# Patient Record
Sex: FEMALE | Race: WHITE
[De-identification: names, ages, dates, MRNs, and addresses within clinical notes are randomized per-mention and may not be internally consistent; named-entity substitution may affect disease eponyms.]

---

## 2017-03-29 ENCOUNTER — HOSPITAL ENCOUNTER (INPATIENT)
Dept: HOSPITAL 4 - SDS | Age: 32
LOS: 1 days | Discharge: HOME | DRG: 777 | End: 2017-03-30
Attending: SPECIALIST | Admitting: SPECIALIST
Payer: COMMERCIAL

## 2017-03-29 VITALS — BODY MASS INDEX: 35.61 KG/M2 | WEIGHT: 201 LBS | HEIGHT: 63 IN

## 2017-03-29 VITALS — SYSTOLIC BLOOD PRESSURE: 121 MMHG

## 2017-03-29 VITALS — SYSTOLIC BLOOD PRESSURE: 104 MMHG

## 2017-03-29 DIAGNOSIS — O43.211: ICD-10-CM

## 2017-03-29 DIAGNOSIS — O00.80: Primary | ICD-10-CM

## 2017-03-29 DIAGNOSIS — O99.211: ICD-10-CM

## 2017-03-29 DIAGNOSIS — E66.01: ICD-10-CM

## 2017-03-29 DIAGNOSIS — Z3A.01: ICD-10-CM

## 2017-03-29 DIAGNOSIS — O34.219: ICD-10-CM

## 2017-03-29 LAB
ALBUMIN SERPL BCP-MCNC: 3.7 G/DL (ref 3.4–4.8)
ALT SERPL W P-5'-P-CCNC: 33 U/L (ref 12–78)
ANION GAP SERPL CALCULATED.3IONS-SCNC: 9 MMOL/L (ref 5–15)
AST SERPL W P-5'-P-CCNC: 22 U/L (ref 10–37)
BASOPHILS # BLD AUTO: 0.1 K/UL (ref 0–0.2)
BASOPHILS NFR BLD AUTO: 0.6 % (ref 0–2)
BILIRUB SERPL-MCNC: 0.5 MG/DL (ref 0–1)
BUN SERPL-MCNC: 7 MG/DL (ref 8–21)
CALCIUM SERPL-MCNC: 8.8 MG/DL (ref 8.4–11)
CHLORIDE SERPL-SCNC: 102 MMOL/L (ref 98–107)
CLOSURE TME COLL+ADP BLD: 71 SECONDS (ref 64–106)
CLOSURE TME COLL+ADP BLD: 82 SECONDS (ref 80–184)
CREAT SERPL-MCNC: 0.72 MG/DL (ref 0.55–1.3)
EOSINOPHIL # BLD AUTO: 0.1 K/UL (ref 0–0.4)
EOSINOPHIL NFR BLD AUTO: 0.7 % (ref 0–4)
ERYTHROCYTE [DISTWIDTH] IN BLOOD BY AUTOMATED COUNT: 12.4 % (ref 9–15)
GFR SERPL CREATININE-BSD FRML MDRD: 120 ML/MIN (ref 90–?)
GLUCOSE SERPL-MCNC: 82 MG/DL (ref 70–99)
HCT VFR BLD AUTO: 41.4 % (ref 36–48)
HGB BLD-MCNC: 14.2 G/DL (ref 12–16)
INR PPP: 1 (ref 0.8–1.2)
LYMPHOCYTES # BLD AUTO: 2.2 K/UL (ref 1–5.5)
LYMPHOCYTES NFR BLD AUTO: 24.4 % (ref 20.5–51.5)
MCH RBC QN AUTO: 29 PG (ref 27–31)
MCHC RBC AUTO-ENTMCNC: 34 % (ref 32–36)
MCV RBC AUTO: 83 FL (ref 79–98)
MONOCYTES # BLD MANUAL: 0.2 K/UL (ref 0–1)
MONOCYTES # BLD MANUAL: 2 % (ref 1.7–9.3)
NEUTROPHILS # BLD AUTO: 6.6 K/UL (ref 1.8–7.7)
NEUTROPHILS NFR BLD AUTO: 72.3 % (ref 40–70)
PLATELET # BLD AUTO: 265 K/UL (ref 130–430)
POTASSIUM SERPL-SCNC: 3.4 MMOL/L (ref 3.5–5.1)
PROT SERPL-MCNC: 7.9 G/DL (ref 6.4–8.3)
PROTHROMBIN TIME: 11 SECS (ref 9.5–12.5)
RBC # BLD AUTO: 4.99 MIL/UL (ref 4.2–6.2)
SODIUM SERPLBLD-SCNC: 136 MMOL/L (ref 136–145)
WBC # BLD AUTO: 9.2 K/UL (ref 4.8–10.8)

## 2017-03-29 PROCEDURE — 10D28ZZ EXTRACTION OF PRODUCTS OF CONCEPTION, ECTOPIC, VIA NATURAL OR ARTIFICIAL OPENING ENDOSCOPIC: ICD-10-PCS | Performed by: SPECIALIST

## 2017-03-29 RX ADMIN — METHYLERGONOVINE MALEATE SCH MG: 0.2 TABLET ORAL at 23:48

## 2017-03-30 RX ADMIN — METHYLERGONOVINE MALEATE SCH MG: 0.2 TABLET ORAL at 14:50

## 2017-03-30 RX ADMIN — METHYLERGONOVINE MALEATE SCH MG: 0.2 TABLET ORAL at 06:26

## 2018-09-06 ENCOUNTER — HOSPITAL ENCOUNTER (OUTPATIENT)
Dept: HOSPITAL 4 - SPU | Age: 33
Setting detail: OBSERVATION
LOS: 1 days | Discharge: HOME | End: 2018-09-07
Attending: SPECIALIST | Admitting: SPECIALIST
Payer: COMMERCIAL

## 2018-09-06 VITALS — BODY MASS INDEX: 42.52 KG/M2 | HEIGHT: 63 IN | WEIGHT: 240 LBS

## 2018-09-06 DIAGNOSIS — Z3A.36: ICD-10-CM

## 2018-09-06 LAB
APPEARANCE UR: CLEAR
BACTERIA URNS QL MICRO: (no result) /HPF
BASOPHILS # BLD AUTO: 0 K/UL (ref 0–0.2)
BASOPHILS NFR BLD AUTO: 0.2 % (ref 0–2)
BILIRUB UR QL STRIP: NEGATIVE
COLOR UR: YELLOW
EOSINOPHIL # BLD AUTO: 0 K/UL (ref 0–0.4)
EOSINOPHIL NFR BLD AUTO: 0.4 % (ref 0–4)
ERYTHROCYTE [DISTWIDTH] IN BLOOD BY AUTOMATED COUNT: 13.4 % (ref 9–15)
GLUCOSE UR STRIP-MCNC: NEGATIVE MG/DL
HCT VFR BLD AUTO: 24.3 % (ref 36–48)
HGB BLD-MCNC: 8.8 G/DL (ref 12–16)
HGB UR QL STRIP: (no result)
KETONES UR STRIP-MCNC: (no result) MG/DL
LEUKOCYTE ESTERASE UR QL STRIP: (no result)
LYMPHOCYTES # BLD AUTO: 0.9 K/UL (ref 1–5.5)
LYMPHOCYTES NFR BLD AUTO: 13.1 % (ref 20.5–51.5)
MCH RBC QN AUTO: 30 PG (ref 27–31)
MCHC RBC AUTO-ENTMCNC: 36 % (ref 32–36)
MCV RBC AUTO: 85 FL (ref 79–98)
MONOCYTES # BLD MANUAL: 0.4 K/UL (ref 0–1)
MONOCYTES # BLD MANUAL: 5.2 % (ref 1.7–9.3)
NEUTROPHILS # BLD AUTO: 5.8 K/UL (ref 1.8–7.7)
NEUTROPHILS NFR BLD AUTO: 81.1 % (ref 40–70)
NITRITE UR QL STRIP: NEGATIVE
PH UR STRIP: 6.5 [PH] (ref 5–8)
PLATELET # BLD AUTO: 137 K/UL (ref 130–430)
PROT UR QL STRIP: (no result)
RBC # BLD AUTO: 2.88 MIL/UL (ref 4.2–6.2)
RBC #/AREA URNS HPF: (no result) /HPF (ref 0–3)
SP GR UR STRIP: 1.01 (ref 1–1.03)
UROBILINOGEN UR STRIP-MCNC: 0.2 MG/DL (ref 0.2–1)
WBC # BLD AUTO: 7.1 K/UL (ref 4.8–10.8)
WBC #/AREA URNS HPF: (no result) /HPF (ref 0–3)

## 2018-09-06 PROCEDURE — 36415 COLL VENOUS BLD VENIPUNCTURE: CPT

## 2018-09-06 PROCEDURE — 86901 BLOOD TYPING SEROLOGIC RH(D): CPT

## 2018-09-06 PROCEDURE — 86900 BLOOD TYPING SEROLOGIC ABO: CPT

## 2018-09-06 PROCEDURE — 86592 SYPHILIS TEST NON-TREP QUAL: CPT

## 2018-09-06 PROCEDURE — 81000 URINALYSIS NONAUTO W/SCOPE: CPT

## 2018-09-06 PROCEDURE — 85025 COMPLETE CBC W/AUTO DIFF WBC: CPT

## 2018-09-06 PROCEDURE — G0378 HOSPITAL OBSERVATION PER HR: HCPCS

## 2018-09-06 PROCEDURE — 81002 URINALYSIS NONAUTO W/O SCOPE: CPT

## 2018-09-06 PROCEDURE — 86886 COOMBS TEST INDIRECT TITER: CPT

## 2018-09-08 ENCOUNTER — HOSPITAL ENCOUNTER (INPATIENT)
Dept: HOSPITAL 4 - SPU | Age: 33
LOS: 6 days | Discharge: HOME | DRG: 778 | End: 2018-09-14
Attending: SPECIALIST | Admitting: SPECIALIST
Payer: COMMERCIAL

## 2018-09-08 VITALS — HEIGHT: 63 IN | BODY MASS INDEX: 42.52 KG/M2 | WEIGHT: 240 LBS

## 2018-09-08 DIAGNOSIS — O60.03: Primary | ICD-10-CM

## 2018-09-08 DIAGNOSIS — Z3A.36: ICD-10-CM

## 2018-09-08 PROCEDURE — G0378 HOSPITAL OBSERVATION PER HR: HCPCS

## 2018-09-08 RX ADMIN — PROMETHAZINE HYDROCHLORIDE PRN MG: 25 INJECTION INTRAMUSCULAR; INTRAVENOUS at 22:10

## 2018-09-08 RX ADMIN — MEPERIDINE HYDROCHLORIDE PRN MG: 100 INJECTION INTRAMUSCULAR; INTRAVENOUS; SUBCUTANEOUS at 22:11

## 2018-09-09 RX ADMIN — MEPERIDINE HYDROCHLORIDE PRN MG: 100 INJECTION INTRAMUSCULAR; INTRAVENOUS; SUBCUTANEOUS at 06:23

## 2018-09-09 RX ADMIN — MEPERIDINE HYDROCHLORIDE PRN MG: 100 INJECTION INTRAMUSCULAR; INTRAVENOUS; SUBCUTANEOUS at 02:21

## 2018-09-09 RX ADMIN — PROMETHAZINE HYDROCHLORIDE PRN MG: 25 INJECTION INTRAMUSCULAR; INTRAVENOUS at 02:20

## 2018-09-09 RX ADMIN — PROMETHAZINE HYDROCHLORIDE PRN MG: 25 INJECTION INTRAMUSCULAR; INTRAVENOUS at 06:24

## 2018-09-20 ENCOUNTER — HOSPITAL ENCOUNTER (INPATIENT)
Dept: HOSPITAL 4 - SPU | Age: 33
LOS: 2 days | Discharge: HOME | End: 2018-09-22
Attending: SPECIALIST | Admitting: SPECIALIST
Payer: COMMERCIAL

## 2018-09-20 VITALS — WEIGHT: 240 LBS | BODY MASS INDEX: 42.52 KG/M2 | HEIGHT: 63 IN

## 2018-09-20 VITALS — SYSTOLIC BLOOD PRESSURE: 120 MMHG

## 2018-09-20 VITALS — SYSTOLIC BLOOD PRESSURE: 132 MMHG

## 2018-09-20 DIAGNOSIS — E66.01: ICD-10-CM

## 2018-09-20 DIAGNOSIS — Z3A.38: ICD-10-CM

## 2018-09-20 DIAGNOSIS — O34.211: Primary | ICD-10-CM

## 2018-09-20 DIAGNOSIS — Z88.5: ICD-10-CM

## 2018-09-20 LAB
APPEARANCE UR: (no result)
BACTERIA URNS QL MICRO: (no result) /HPF
BASOPHILS # BLD AUTO: 0.1 K/UL (ref 0–0.2)
BASOPHILS NFR BLD AUTO: 0.7 % (ref 0–2)
BILIRUB UR QL STRIP: NEGATIVE
COLOR UR: YELLOW
EOSINOPHIL # BLD AUTO: 0.1 K/UL (ref 0–0.4)
EOSINOPHIL NFR BLD AUTO: 0.9 % (ref 0–4)
ERYTHROCYTE [DISTWIDTH] IN BLOOD BY AUTOMATED COUNT: 13.6 % (ref 9–15)
GLUCOSE UR STRIP-MCNC: NEGATIVE MG/DL
HCT VFR BLD AUTO: 36 % (ref 36–48)
HGB BLD-MCNC: 12.3 G/DL (ref 12–16)
HGB UR QL STRIP: NEGATIVE
KETONES UR STRIP-MCNC: (no result) MG/DL
LEUKOCYTE ESTERASE UR QL STRIP: (no result)
LYMPHOCYTES # BLD AUTO: 2.1 K/UL (ref 1–5.5)
LYMPHOCYTES NFR BLD AUTO: 22.8 % (ref 20.5–51.5)
MCH RBC QN AUTO: 30 PG (ref 27–31)
MCHC RBC AUTO-ENTMCNC: 34 % (ref 32–36)
MCV RBC AUTO: 86 FL (ref 79–98)
MONOCYTES # BLD MANUAL: 0.4 K/UL (ref 0–1)
MONOCYTES # BLD MANUAL: 4.6 % (ref 1.7–9.3)
NEUTROPHILS # BLD AUTO: 6.3 K/UL (ref 1.8–7.7)
NEUTROPHILS NFR BLD AUTO: 71 % (ref 40–70)
NITRITE UR QL STRIP: NEGATIVE
PH UR STRIP: 6 [PH] (ref 5–8)
PLATELET # BLD AUTO: 178 K/UL (ref 130–430)
PROT UR QL STRIP: NEGATIVE
RBC # BLD AUTO: 4.17 MIL/UL (ref 4.2–6.2)
RBC #/AREA URNS HPF: (no result) /HPF (ref 0–3)
SP GR UR STRIP: 1.02 (ref 1–1.03)
UROBILINOGEN UR STRIP-MCNC: 0.2 MG/DL (ref 0.2–1)
WBC # BLD AUTO: 9 K/UL (ref 4.8–10.8)
WBC #/AREA URNS HPF: (no result) /HPF (ref 0–3)

## 2018-09-20 RX ADMIN — CEFAZOLIN SODIUM SCH MLS/HR: 1 INJECTION, SOLUTION INTRAVENOUS at 20:13

## 2018-09-20 RX ADMIN — KETOROLAC TROMETHAMINE SCH MG: 30 INJECTION, SOLUTION INTRAMUSCULAR; INTRAVENOUS at 13:00

## 2018-09-20 RX ADMIN — MEPERIDINE HYDROCHLORIDE PRN MG: 100 INJECTION INTRAMUSCULAR; INTRAVENOUS; SUBCUTANEOUS at 11:16

## 2018-09-20 RX ADMIN — PROMETHAZINE HYDROCHLORIDE PRN MG: 25 INJECTION INTRAMUSCULAR; INTRAVENOUS at 20:10

## 2018-09-20 RX ADMIN — PROMETHAZINE HYDROCHLORIDE PRN MG: 25 INJECTION INTRAMUSCULAR; INTRAVENOUS at 11:14

## 2018-09-20 RX ADMIN — KETOROLAC TROMETHAMINE SCH MG: 30 INJECTION, SOLUTION INTRAMUSCULAR; INTRAVENOUS at 18:25

## 2018-09-20 RX ADMIN — MEPERIDINE HYDROCHLORIDE PRN MG: 100 INJECTION INTRAMUSCULAR; INTRAVENOUS; SUBCUTANEOUS at 14:47

## 2018-09-20 RX ADMIN — CEFAZOLIN SODIUM SCH MLS/HR: 1 INJECTION, SOLUTION INTRAVENOUS at 14:30

## 2018-09-20 RX ADMIN — PROMETHAZINE HYDROCHLORIDE PRN MG: 25 INJECTION INTRAMUSCULAR; INTRAVENOUS at 14:47

## 2018-09-20 RX ADMIN — MEPERIDINE HYDROCHLORIDE PRN MG: 100 INJECTION INTRAMUSCULAR; INTRAVENOUS; SUBCUTANEOUS at 20:22

## 2018-09-21 PROCEDURE — 30233S1 TRANSFUSION OF NONAUTOLOGOUS GLOBULIN INTO PERIPHERAL VEIN, PERCUTANEOUS APPROACH: ICD-10-PCS | Performed by: SPECIALIST

## 2018-09-21 RX ADMIN — MEPERIDINE HYDROCHLORIDE PRN MG: 100 INJECTION INTRAMUSCULAR; INTRAVENOUS; SUBCUTANEOUS at 02:42

## 2018-09-21 RX ADMIN — MEPERIDINE HYDROCHLORIDE PRN MG: 100 INJECTION INTRAMUSCULAR; INTRAVENOUS; SUBCUTANEOUS at 20:15

## 2018-09-21 RX ADMIN — PROMETHAZINE HYDROCHLORIDE PRN MG: 25 INJECTION INTRAMUSCULAR; INTRAVENOUS at 02:40

## 2018-09-21 RX ADMIN — PROMETHAZINE HYDROCHLORIDE PRN MG: 25 INJECTION INTRAMUSCULAR; INTRAVENOUS at 20:15

## 2018-09-21 RX ADMIN — MEPERIDINE HYDROCHLORIDE PRN MG: 100 INJECTION INTRAMUSCULAR; INTRAVENOUS; SUBCUTANEOUS at 06:59

## 2018-09-21 RX ADMIN — PROMETHAZINE HYDROCHLORIDE PRN MG: 25 INJECTION INTRAMUSCULAR; INTRAVENOUS at 06:56

## 2018-09-21 RX ADMIN — MEPERIDINE HYDROCHLORIDE PRN MG: 100 INJECTION INTRAMUSCULAR; INTRAVENOUS; SUBCUTANEOUS at 11:36

## 2018-09-21 RX ADMIN — PROMETHAZINE HYDROCHLORIDE PRN MG: 25 INJECTION INTRAMUSCULAR; INTRAVENOUS at 11:36

## 2018-09-21 RX ADMIN — KETOROLAC TROMETHAMINE SCH MG: 30 INJECTION, SOLUTION INTRAMUSCULAR; INTRAVENOUS at 12:00

## 2018-09-21 RX ADMIN — IBUPROFEN SCH MG: 600 TABLET, FILM COATED ORAL at 23:30

## 2018-09-22 RX ADMIN — IBUPROFEN SCH MG: 600 TABLET, FILM COATED ORAL at 12:27

## 2018-09-22 RX ADMIN — OXYCODONE HYDROCHLORIDE AND ACETAMINOPHEN PRN TAB: 5; 325 TABLET ORAL at 16:23

## 2018-09-22 RX ADMIN — IBUPROFEN SCH MG: 600 TABLET, FILM COATED ORAL at 05:35

## 2018-09-22 RX ADMIN — OXYCODONE HYDROCHLORIDE AND ACETAMINOPHEN PRN TAB: 5; 325 TABLET ORAL at 12:28

## 2018-09-22 RX ADMIN — IBUPROFEN SCH MG: 600 TABLET, FILM COATED ORAL at 17:57

## 2018-09-22 RX ADMIN — OXYCODONE HYDROCHLORIDE AND ACETAMINOPHEN PRN TAB: 5; 325 TABLET ORAL at 19:05

## 2018-09-22 NOTE — NUR
Dietitian Recommendations 



* Recommend continuing regular diet per MD

LP, RD



Please refer to Nutrition Assessment for details.

## 2025-02-18 ENCOUNTER — HOSPITAL ENCOUNTER (EMERGENCY)
Dept: HOSPITAL 15 - ER | Age: 40
Discharge: HOME | End: 2025-02-18
Payer: COMMERCIAL

## 2025-02-18 VITALS
RESPIRATION RATE: 17 BRPM | HEART RATE: 96 BPM | TEMPERATURE: 98 F | OXYGEN SATURATION: 100 % | SYSTOLIC BLOOD PRESSURE: 139 MMHG | DIASTOLIC BLOOD PRESSURE: 79 MMHG

## 2025-02-18 VITALS — BODY MASS INDEX: 25.74 KG/M2 | WEIGHT: 145.28 LBS | HEIGHT: 63 IN
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
## 2025-02-18 DIAGNOSIS — Z79.891: ICD-10-CM
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
## 2025-02-18 DIAGNOSIS — Z88.5: ICD-10-CM
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
## 2025-02-18 DIAGNOSIS — S63.501A: Primary | ICD-10-CM

## 2025-02-18 PROCEDURE — 73110 X-RAY EXAM OF WRIST: CPT

## 2025-02-18 PROCEDURE — 99283 EMERGENCY DEPT VISIT LOW MDM: CPT

## 2025-02-18 PROCEDURE — 96372 THER/PROPH/DIAG INJ SC/IM: CPT

## 2025-02-18 RX ADMIN — KETOROLAC TROMETHAMINE ONE MG: 30 INJECTION, SOLUTION INTRAMUSCULAR; INTRAVENOUS at 16:45

## 2025-02-18 NOTE — ED.PDOC
Musculoskeletal


HPI Comments


39 year old F presents for possible fracture to the right wirst.


Reports this is a work injury


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


Landed on right side but c/o right wrist pain


Now experiencing pain w/ flexion and extension


Denies previous fractures.


Chief Complaint:  Fall Injury


Time Seen by MD:  13:46


Primary Care Provider:  none


Reviewed Notes:  Nurses Notes, Medications, Allergies


Allergies:  


Coded Allergies:  


     Codeine (Verified  Allergy, Unknown, 2/18/25)


     Morphine (Verified  Allergy, Unknown, 2/18/25)


Home Meds


Active Scripts


Naproxen (Naproxen) 500 Mg Tab, 500 MG PO BIDPRN PRN for 10 Days, #20 TAB 0 

Refills


   Prov:JUAN FRANCISCO DELUCA NP         2/18/25


Information Source:  Patient


Mode of Arrival:  Ambulatory





Social History


Smoker:  Non-Smoker


Alcohol:  Denies ETOH Use


Drugs:  Denies Drug Use





All Other Systems:  Reviewed and Negative (Per HPI)





Physical Exam


General Appearance:  No Apparent Distress, Normal


HEENT:  Normal ENT Inspection, Pharynx Normal, TMs Normal


Neck:  Full Range of Motion, Non-Tender, Normal, Normal Inspection


Respiratory:  Chest Non-Tender, Lungs Clear, No Accessory Muscle Use, No 

Respiratory Distress, Normal Breath Sounds


Cardiovascular:  No Murmur, No Gallop, Regular Rate/Rhythm


Breast Exam:  Deferred


Gastrointestinal:  No Organomegaly, Non Tender, No Pulsatile Mass, Normal Bowel 

Sounds, Soft


Genitalia:  Deferred


Pelvic:  Deferred


Rectal:  Deferred


Extremities:  No calf tenderness, Normal capillary refill, Normal inspection, 

Normal range of motion, Non-tender, No pedal edema


Musculoskeletal :  


   Location:  Right


   Extremity Location:  Wrist (normal on inspection)


   Apperance:  Normal


Neurologic:  Alert, No Motor Deficits, Normal Affect, Normal Mood, No Sensory 

Deficits


Cerebellar Function:  Normal


Reflexes:  Normal


Skin:  Dry, Normal Color, Warm


Lymphatic:  No Adenopathy





Was a procedure done?


Was a procedure done?:  No





Differential Diagnosis EXT


Differential Diagnosis:  Fracture, Sprain, Dislocation





X-Ray, Labs, Meds, VS





                                   Vital Signs








  Date Time  Temp Pulse Resp B/P (MAP) Pulse Ox O2 Delivery O2 Flow Rate FiO2


 


2/18/25 14:23  96 17  100 Room Air  


 


2/18/25 14:23 98.0 96 17 139/79 (99) 100   





 98.0       


 


2/18/25 13:34 98.0 96 17 139/74 (95) 100   








X-Ray, Labs, Meds, VS Comment


History and examination consistent of muscular injury


X-rays ordered, read by radiologist and reviewed by me


Low likelihood of bony or more serious injury, VSS, pt stable





Take  w/ food as needed for pain


Recommended heat therapy


Reviewed RICE management


Avoid heavy lifting or strenuous activity


Recommended range of motion exercises and limit heavy activity for 1 week


If no improvement advised patient to return to the emergency department for 

follow-up.  Discussed possibility of a occult fracture








On reevaluation, patient had symptomatic improvement. 


Patient is stable for discharge at this time.


External notes reviewed.


Test results and diagnostic imaging interpreted.


All diagnostic findings, discharge care, education and instructions provided


Follow-up with PCP in 2 to 3 days


Patient verbalized understanding and agreed to treatment plan


Vital signs stable, afebrile, no acute distress noted


Patient ambulatory with strong steady gait


Advised to return precautions for any new or worsening symptoms, return to ER 

immediately for re-evaluation





Patient is aware that the purpose of this visit was for an acute medical 

emergency requiring emergent stabilization.  Chronic conditions, including 

malignancies have not been ruled out.  Patient is instructed to follow up with 

PCP as directed and discharge instructions for continued care and workup. If 

unable to arrange follow-up, patient is to return to the emergency department 

for reassessment.  Patient (parent or legal guardian if applicable) was given 

verbal and written discharge instructions and acknowledges understanding.


Time of 1ST Reevaluation:  16:00


Reevaluation 1ST:  Improved


Patient Education/Counseling:  Diagnosis, Treatment


Family Education/Counseling:  Diagnosis, Treatment





Departure 1


Departure


Time of Disposition:  16:20


Impression:  


   Primary Impression:  


   Right wrist sprain


   Qualified Codes:  S63.501A - Unspecified sprain of right wrist, initial 

   encounter


Disposition:  01 HOME / SELF CARE / HOMELESS


Condition:  Stable


e-Prescriptions


Naproxen (Naproxen) 500 Mg Tab


500 MG PO BIDPRN PRN for 10 Days, #20 TAB 0 Refills


   Prov: JUAN FRANCISCO DELUCA NP         2/18/25





Critical Care Note


Critical Care Time?:  No





Stability


Stability form required:  No





Heart Score


Heart Score:  








Heart Score Response (Comments) Value


 


History N/A 0


 


EKG N/A 0


 


Age N/A 0


 


Risk Factors N/A 0


 


Troponin N/A 0


 


Total  0

















JUAN FRANCISCO DELUCA NP                Feb 18, 2025 16:21

## 2025-02-18 NOTE — DVH
CLINICAL INDICATION:    Trauma, pain



TECHNIQUE:   XY R WRIST 3+ VIEW XRAY



Comparison: None



FINDINGS/IMPRESSION: : 



There is no evidence of acute fracture or dislocation.



Soft tissues are unremarkable.



Electronically Signed by: Rigo De Leon at 02/18/2025 15:54:29 PM